# Patient Record
Sex: FEMALE | Race: WHITE
[De-identification: names, ages, dates, MRNs, and addresses within clinical notes are randomized per-mention and may not be internally consistent; named-entity substitution may affect disease eponyms.]

---

## 2018-04-17 ENCOUNTER — HOSPITAL ENCOUNTER (OUTPATIENT)
Dept: HOSPITAL 62 - CCL | Age: 68
Discharge: HOME | End: 2018-04-17
Attending: INTERNAL MEDICINE
Payer: MEDICARE

## 2018-04-17 VITALS — DIASTOLIC BLOOD PRESSURE: 67 MMHG | SYSTOLIC BLOOD PRESSURE: 123 MMHG

## 2018-04-17 DIAGNOSIS — E11.9: ICD-10-CM

## 2018-04-17 DIAGNOSIS — I50.20: Primary | ICD-10-CM

## 2018-04-17 DIAGNOSIS — R07.9: ICD-10-CM

## 2018-04-17 DIAGNOSIS — I11.9: ICD-10-CM

## 2018-04-17 DIAGNOSIS — I25.119: ICD-10-CM

## 2018-04-17 DIAGNOSIS — Z88.0: ICD-10-CM

## 2018-04-17 DIAGNOSIS — E66.9: ICD-10-CM

## 2018-04-17 DIAGNOSIS — E03.9: ICD-10-CM

## 2018-04-17 DIAGNOSIS — E70.1: ICD-10-CM

## 2018-04-17 DIAGNOSIS — Z88.2: ICD-10-CM

## 2018-04-17 DIAGNOSIS — Z79.899: ICD-10-CM

## 2018-04-17 DIAGNOSIS — E53.8: ICD-10-CM

## 2018-04-17 LAB
ANION GAP SERPL CALC-SCNC: 15 MMOL/L (ref 5–19)
BUN SERPL-MCNC: 22 MG/DL (ref 7–20)
CALCIUM: 10.3 MG/DL (ref 8.4–10.2)
CHLORIDE SERPL-SCNC: 105 MMOL/L (ref 98–107)
CO2 SERPL-SCNC: 23 MMOL/L (ref 22–30)
ERYTHROCYTE [DISTWIDTH] IN BLOOD BY AUTOMATED COUNT: 13.2 % (ref 11.5–14)
GLUCOSE SERPL-MCNC: 140 MG/DL (ref 75–110)
HCT VFR BLD CALC: 38 % (ref 36–47)
HGB BLD-MCNC: 12.7 G/DL (ref 12–15.5)
INR PPP: 0.96
MCH RBC QN AUTO: 28.6 PG (ref 27–33.4)
MCHC RBC AUTO-ENTMCNC: 33.3 G/DL (ref 32–36)
MCV RBC AUTO: 86 FL (ref 80–97)
PLATELET # BLD: 253 10^3/UL (ref 150–450)
POTASSIUM SERPL-SCNC: 3.8 MMOL/L (ref 3.6–5)
PROTHROMBIN TIME: 13.3 SEC (ref 11.4–15.4)
RBC # BLD AUTO: 4.43 10^6/UL (ref 3.72–5.28)
SODIUM SERPL-SCNC: 143.4 MMOL/L (ref 137–145)
WBC # BLD AUTO: 5.5 10^3/UL (ref 4–10.5)

## 2018-04-17 PROCEDURE — 85610 PROTHROMBIN TIME: CPT

## 2018-04-17 PROCEDURE — 93458 L HRT ARTERY/VENTRICLE ANGIO: CPT

## 2018-04-17 PROCEDURE — 36415 COLL VENOUS BLD VENIPUNCTURE: CPT

## 2018-04-17 PROCEDURE — 83735 ASSAY OF MAGNESIUM: CPT

## 2018-04-17 PROCEDURE — 4A023N7 MEASUREMENT OF CARDIAC SAMPLING AND PRESSURE, LEFT HEART, PERCUTANEOUS APPROACH: ICD-10-PCS

## 2018-04-17 PROCEDURE — 85027 COMPLETE CBC AUTOMATED: CPT

## 2018-04-17 PROCEDURE — 80048 BASIC METABOLIC PNL TOTAL CA: CPT

## 2018-04-17 NOTE — OPERATIVE REPORT
Operative Report


DATE OF SURGERY: 04/17/18


PREOPERATIVE DIAGNOSIS: Left Heart Catheterization with coronary angiography 

and left ventriculography


POSTOPERATIVE DIAGNOSIS: Normal nonobstructive coronary disease with mild 

global LV systolic dysfunction.


OPERATION: Left heart catheterization with coronary angiography and left 

ventriculography.


SURGEON: THERESE DONAHUE


ANESTHESIA: Moderate Sedation


COMPLICATIONS: 





no apparent complications


ESTIMATED BLOOD LOSS: minimal


INTRAOPERATIVE FINDINGS: Minimal nonobstructive coronary artery disease with 

mild global LV systolic dysfunction.


PROCEDURE: 


Procedure: 68-year-old female with complaints of recurrent angina pectoris with 

exertion.  A stress test was performed which showed normal Cardiolite perfusion 

with mild LV systolic dysfunction.  Despite no evidence of ischemia nor infarct

, the patient continued to have symptoms on medical therapy.  For continued 

symptoms and concern of multivessel disease given her comorbidities, she is 

referred for diagnostic coronary angiography.  





Procedures performed:


1.  Left heart catheterization.


2.  Selective coronary arteriography.


3.  Left ventriculography.





: Therese Donahue DO





Contrast utilized: 110 mL of Optiray 320





Procedure brief: After informed consent was obtained, the patient was brought 

to the catheterization lab in stable condition.  Bilateral groins and the right 

wrist were prepped and draped in sterile fashion.  The patient was given IV 

moderate conscious sedation by the cardiac catheterization lab nurse with 

Versed and fentanyl which was supervised by the interventional cardiologist.  

The following parameters were monitored: Oxygen saturation, heart rate, blood 

pressure, and response to care.  Total physician intra-service time was 32 

minutes.  





After local infiltration of the right wrist with 2% lidocaine, the right radial 

artery was punctured with a micropuncture needle and a 6 Moroccan Terumo 

hydrophilic sheath was inserted into the right radial artery using modified 

cylinder technique and a micropuncture kit.  The sheath was then flushed with a 

spasmolytic cocktail of nitroglycerin, verapamil, and lidocaine.  The patient 

was given 5000 units of IV heparin.





Next, left heart catheterization was performed using a 6 Moroccan Tiger 

diagnostic catheter across the aortic valve in a retrograde fashion.  Left 

ventricular pressure was measured and the catheter was positioned retrograde 

through the aortic valve and pressure measured in the aortic root.  Next, 

selective coronary arteriography was performed using a 6 Moroccan Tiger catheter 

is seen in the ostium the right coronary artery.  Multiple injections were 

performed in orthogonal views and the catheter was then positioned into the 

ostium the left main coronary artery.  Multiple injections were performed in 

orthogonal views of the catheter was removed from the body without difficulty.  

It was exchanged for a 6 Moroccan pigtail catheter which was used to position 

retrograde across the aortic valve and left ventricular cavity.  Left 

ventriculogram was performed in the FERNÁNDEZ projection.  The catheter was removed 

from the body without difficulty.





Findings:





Left heart hemodynamics


/10, LVEDP 14


Ao 125/57 (87)





Coronary arteriography





Left main coronary artery: Left main coronary is angiographically normal giving 

rise to the LAD and left circumflex arteries.





Left anterior descending artery: The left anterior descending artery courses 

over the anterior interventricular septum and terminates across the cardiac 

apex.  The LAD provides 2 major diagonal branch arteries.  The LAD has minimal 

irregularities throughout its course.  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 

to distal portion of the artery.  There is no significant obstructive disease 

throughout the LAD system.  The first major diagonal branch artery is large 

caliber and has minimal irregularity.  The second diagonal branch artery is of 

smaller caliber and angiographically normal.





Left circumflex artery: The left circumflex artery is a large-caliber artery 

and gives rise to one large obtuse marginal branch artery.  After the 

bifurcation of the obtuse marginal branch artery, the left AV groove is quite 

small caliber and terminates into small left posterolateral branches.  The left 

circumflex artery and the obtuse marginal branch artery harbor minimal luminal 

irregularities.





Right coronary artery: The right coronary artery is the dominant vessel gives 

rise to the right posterior descending artery.  The right coronary artery has 

no significant luminal irregularity throughout its course.  It bifurcates 

normally into the right posterior descending artery and the right 

posterolateral artery.  These terminal branches have no significant disease but 

are of very small caliber.





Left ventriculography: In the FERNÁNDEZ projection, the LV is mildly dilated.  There 

is global LV systolic dysfunction with ejection fraction 45-50%.  There is no 

significant mitral regurgitation.





Right radial artery sheath was removed and hemostasis achieved with the TR 

band.  The patient tolerated the procedure well and there were no apparent 

complications at the end of the case.  There is very minimal blood loss.





Impression:


1.  Very minimal coronary artery disease, out of proportion to the level of 

cardiomyopathy.


2.  No evidence of aortic stenosis.


3.  Mild global LV systolic dysfunction, EF 45-50%.


4.  Trace mitral regurgitation.





Plan:


1.  Continue optimal medical therapy.


2.  Follow-up with Dr. Levin as an outpatient.





I appreciate the opportunity to help participate in this patient's 

cardiovascular care.  If you should have any questions for me regarding this 

patient's cardiac catheterization, please do not hesitate to contact me at the 

[facility].

## 2018-04-17 NOTE — DISCHARGE SUMMARY
Discharge Summary (SDC)





- Discharge


Final Diagnosis: 


Minimal nonobstructive coronary disease


Mild global LV systolic dysfunction





Date of Surgery: 04/17/18


Discharge Date: 04/17/18


Condition: Good


Treatment or Instructions: 


1.  No lifting greater than 10 pounds for the next week.


2.  Shower only for the next 7 days.


3.  No operating heavy machinery for the next 24 hours.


4.  Please refer to discharge medications for complete list of medications and 

other medication instructions.  Do NOT restart metformin until Friday morning, 

April 20, 2018.


5.  If you notice any severe pain, redness, or swelling in the right wrist, 

call the [facility] immediately.


6.  Follow-up with Dr. Levin on April 25 as planned


Discharge Diet: Cardiac, Diabetic


Discharge Activity: No Lifting Over 10 Pounds


Home Care Assistance: None Needed


Report the Following to Your Physician Immediately: Increase in Pain, Fever 

over 101 Degrees, Unusual Bleeding, Redness, Swelling, Tingling Sensation

## 2018-09-07 ENCOUNTER — HOSPITAL ENCOUNTER (EMERGENCY)
Dept: HOSPITAL 62 - ER | Age: 68
LOS: 1 days | Discharge: HOME | End: 2018-09-08
Payer: MEDICARE

## 2018-09-07 DIAGNOSIS — N39.0: Primary | ICD-10-CM

## 2018-09-07 DIAGNOSIS — R07.9: ICD-10-CM

## 2018-09-07 DIAGNOSIS — E11.9: ICD-10-CM

## 2018-09-07 DIAGNOSIS — E03.9: ICD-10-CM

## 2018-09-07 DIAGNOSIS — E78.5: ICD-10-CM

## 2018-09-07 DIAGNOSIS — I10: ICD-10-CM

## 2018-09-07 LAB
ADD MANUAL DIFF: NO
ALBUMIN SERPL-MCNC: 4.5 G/DL (ref 3.5–5)
ALP SERPL-CCNC: 62 U/L (ref 38–126)
ALT SERPL-CCNC: 15 U/L (ref 9–52)
ANION GAP SERPL CALC-SCNC: 12 MMOL/L (ref 5–19)
APPEARANCE UR: (no result)
APTT PPP: YELLOW S
AST SERPL-CCNC: 26 U/L (ref 14–36)
BASOPHILS # BLD AUTO: 0.1 10^3/UL (ref 0–0.2)
BASOPHILS NFR BLD AUTO: 0.8 % (ref 0–2)
BILIRUB DIRECT SERPL-MCNC: 0.3 MG/DL (ref 0–0.4)
BILIRUB SERPL-MCNC: 0.4 MG/DL (ref 0.2–1.3)
BILIRUB UR QL STRIP: NEGATIVE
BUN SERPL-MCNC: 21 MG/DL (ref 7–20)
CALCIUM: 9.8 MG/DL (ref 8.4–10.2)
CHLORIDE SERPL-SCNC: 107 MMOL/L (ref 98–107)
CO2 SERPL-SCNC: 23 MMOL/L (ref 22–30)
EOSINOPHIL # BLD AUTO: 0.2 10^3/UL (ref 0–0.6)
EOSINOPHIL NFR BLD AUTO: 2.8 % (ref 0–6)
ERYTHROCYTE [DISTWIDTH] IN BLOOD BY AUTOMATED COUNT: 14 % (ref 11.5–14)
GLUCOSE SERPL-MCNC: 99 MG/DL (ref 75–110)
GLUCOSE UR STRIP-MCNC: NEGATIVE MG/DL
HCT VFR BLD CALC: 37.4 % (ref 36–47)
HGB BLD-MCNC: 12.2 G/DL (ref 12–15.5)
KETONES UR STRIP-MCNC: NEGATIVE MG/DL
LYMPHOCYTES # BLD AUTO: 1.7 10^3/UL (ref 0.5–4.7)
LYMPHOCYTES NFR BLD AUTO: 27.4 % (ref 13–45)
MCH RBC QN AUTO: 27.8 PG (ref 27–33.4)
MCHC RBC AUTO-ENTMCNC: 32.6 G/DL (ref 32–36)
MCV RBC AUTO: 85 FL (ref 80–97)
MONOCYTES # BLD AUTO: 0.5 10^3/UL (ref 0.1–1.4)
MONOCYTES NFR BLD AUTO: 7.7 % (ref 3–13)
NEUTROPHILS # BLD AUTO: 3.7 10^3/UL (ref 1.7–8.2)
NEUTS SEG NFR BLD AUTO: 61.3 % (ref 42–78)
NITRITE UR QL STRIP: NEGATIVE
NT PRO BNP: 101 PG/ML (ref 5–900)
PH UR STRIP: 5 [PH] (ref 5–9)
PLATELET # BLD: 300 10^3/UL (ref 150–450)
POTASSIUM SERPL-SCNC: 4.1 MMOL/L (ref 3.6–5)
PROT SERPL-MCNC: 8.2 G/DL (ref 6.3–8.2)
PROT UR STRIP-MCNC: 30 MG/DL
RBC # BLD AUTO: 4.38 10^6/UL (ref 3.72–5.28)
SODIUM SERPL-SCNC: 142.3 MMOL/L (ref 137–145)
SP GR UR STRIP: 1.02
TOTAL CELLS COUNTED % (AUTO): 100 %
TROPONIN I SERPL-MCNC: < 0.012 NG/ML
UROBILINOGEN UR-MCNC: NEGATIVE MG/DL (ref ?–2)
WBC # BLD AUTO: 6 10^3/UL (ref 4–10.5)

## 2018-09-07 PROCEDURE — 83880 ASSAY OF NATRIURETIC PEPTIDE: CPT

## 2018-09-07 PROCEDURE — 87088 URINE BACTERIA CULTURE: CPT

## 2018-09-07 PROCEDURE — 99285 EMERGENCY DEPT VISIT HI MDM: CPT

## 2018-09-07 PROCEDURE — 80053 COMPREHEN METABOLIC PANEL: CPT

## 2018-09-07 PROCEDURE — 81001 URINALYSIS AUTO W/SCOPE: CPT

## 2018-09-07 PROCEDURE — 87186 SC STD MICRODIL/AGAR DIL: CPT

## 2018-09-07 PROCEDURE — 84484 ASSAY OF TROPONIN QUANT: CPT

## 2018-09-07 PROCEDURE — 87086 URINE CULTURE/COLONY COUNT: CPT

## 2018-09-07 PROCEDURE — 36415 COLL VENOUS BLD VENIPUNCTURE: CPT

## 2018-09-07 PROCEDURE — 71046 X-RAY EXAM CHEST 2 VIEWS: CPT

## 2018-09-07 PROCEDURE — 93005 ELECTROCARDIOGRAM TRACING: CPT

## 2018-09-07 PROCEDURE — 93010 ELECTROCARDIOGRAM REPORT: CPT

## 2018-09-07 PROCEDURE — 85025 COMPLETE CBC W/AUTO DIFF WBC: CPT

## 2018-09-07 NOTE — RADIOLOGY REPORT (SQ)
EXAM DESCRIPTION:  CHEST 2 VIEWS



COMPLETED DATE/TIME:  9/7/2018 6:20 pm



REASON FOR STUDY:  chest pain



COMPARISON:  December 2012



EXAM PARAMETERS:  NUMBER OF VIEWS: two views

TECHNIQUE: Digital Frontal and Lateral radiographic views of the chest acquired.

RADIATION DOSE: NA

LIMITATIONS: none



FINDINGS:  LUNGS AND PLEURA: No opacities, masses or pneumothorax. No pleural effusion.

MEDIASTINUM AND HILAR STRUCTURES: No masses or contour abnormalities.

HEART AND VASCULAR STRUCTURES: Heart normal size.  No evidence for failure.

BONES: No acute findings.

HARDWARE: None in the chest.

OTHER: No other significant finding.



IMPRESSION:  NO ACUTE RADIOGRAPHIC FINDING IN THE CHEST.



TECHNICAL DOCUMENTATION:  JOB ID:  [id number]

 2011 [email or web address]- All Rights Reserved



Reading location - IP/workstation name: [email or web address]

## 2018-09-07 NOTE — EKG REPORT
SEVERITY:- ABNORMAL ECG -

SINUS RHYTHM

PAIRED VENTRICULAR PREMATURE COMPLEXES

NONSPECIFIC ST-T CHANGES  LATERAL LEADS

:

Confirmed by: Chau Levine MD 07-Sep-2018 19:13:49

## 2018-09-07 NOTE — ER DOCUMENT REPORT
ED Medical Screen (RME)





- General


Chief Complaint: Chest Pain


Stated Complaint: CHEST PAIN


Time Seen by Provider: 09/07/18 17:19


Mode of Arrival: Ambulatory


Information source: Patient, [facility] Records


Notes: 





68-year-old female with type 2 diabetes, hyperlipidemia, hypertension, 

hypothyroidism presents with complaint of left-sided chest pain that started 

last night while in the shower.  Patient describes the pain as intermittent, 

burning and worse with movement.  Patient denies any associated nausea, vomiting

, shortness of breath, recent illness.  Patient states that she was seen by Dr. Levin and a stress test was performed at [facility] but she is 

on sure of the results.  She does state that she was told that she has a "weak 

heart".  Patient does admit to increased stress with the sister who is in the 

hospital recently underwent open heart surgery.  Patient did have a cardiac 

cath here in April 2018.








I have greeted and performed a rapid initial assessment of this patient.  A 

comprehensive ED assessment and evaluation of the patient, analysis of test 

results and completion of medical decision making process we will be contacted 

by additional ED providers.











PHYSICAL EXAMINATION:





GENERAL: Well-appearing, well-nourished and in no acute distress.





HEAD: Atraumatic, normocephalic.





EYES: Pupils equal round extraocular movements intact,  conjunctiva are normal.





LUNGS: CTAB


Cardio.  Regular rate rhythm without murmurs.


Musculoskeletal: Normal range of motion





NEUROLOGICAL:  Normal speech, normal gait. 





PSYCH: Normal mood, normal affect.





SKIN: Warm, Dry, normal turgor, no rashes or lesions noted.


TRAVEL OUTSIDE OF THE [place] IN LAST 30 DAYS: No





- HPI


Onset: Yesterday


Onset/Duration: Sudden, Intermittent


Quality of pain: Burning, Other - tightness


Severity: Mild


Associated Symptoms: Chest pain.  denies: Cough (productive), Shortness of 

breath


Exacerbated by: Movement


Relieved by: Remaining still


Similar symptoms previously: No


Recently seen / treated by doctor: Yes





- Related Data


Smoking: Non-smoker


Frequency of alcohol use: None


Drug Abuse: None


Allergies/Adverse Reactions: 


 





silicone Allergy (Intermediate, Verified 04/17/18 07:01)


 INFECTION IN EAR


Penicillins Allergy (Mild, Verified 04/17/18 07:01)


 RASH,ITCHING


Sulfa (Sulfonamide Antibiotics) Allergy (Unknown, Verified 04/17/18 07:01)


 











Past Medical History





- Social History


Chew tobacco use (# tins/day): No


Frequency of alcohol use: None


Drug Abuse: None





- Past Medical History


Cardiac Medical History: Reports: Hx Hypercholesterolemia, Hx Hypertension


   Denies: Hx Coronary Artery Disease, Hx Heart Attack


Pulmonary Medical History: 


   Denies: Hx Asthma, Hx Bronchitis, Hx COPD, Hx Pneumonia


Neurological Medical History: Denies: Hx Cerebrovascular Accident, Hx Seizures


Endocrine Medical History: Reports: Hx Diabetes Mellitus Type 2


Renal/ Medical History: Denies: Hx Peritoneal Dialysis


Musculoskeltal Medical History: Denies Hx Arthritis


Past Surgical History: Reports: Hx Orthopedic Surgery - right elbow, Hx Tubal 

Ligation





- Immunizations


Hx Diphtheria, Pertussis, Tetanus Vaccination: No


History of Influenza Vaccine for 10/2017 - 3/2018 Season: Yes





Physical Exam





- Vital signs


Vitals: 


 











Temp Pulse Resp BP Pulse Ox


 


 98.4 F   97   18   143/68 H  94 


 


 09/07/18 16:21  09/07/18 16:21  09/07/18 16:21  09/07/18 16:21  09/07/18 16:21














Course





- Vital Signs


Vital signs: 


 











Temp Pulse Resp BP Pulse Ox


 


 98.4 F   97   18   143/68 H  94 


 


 09/07/18 16:21  09/07/18 16:21  09/07/18 16:21  09/07/18 16:21  09/07/18 16:21

## 2018-09-08 VITALS — SYSTOLIC BLOOD PRESSURE: 143 MMHG | DIASTOLIC BLOOD PRESSURE: 83 MMHG

## 2018-09-08 NOTE — ER DOCUMENT REPORT
ED General





- General


Chief Complaint: Chest Pain


Stated Complaint: CHEST PAIN


Time Seen by Provider: 09/07/18 17:19


Mode of Arrival: Ambulatory


Notes: 





Patient is a 68-year-old female with a past medical history of hypertension, 

hyperlipidemia, hypothyroidism who presents with continuous left-sided chest 

discomfort last evening.  Approximately total of 36 hours in duration.  She 

notes that the chest pain has now resolved since being here in the emergency 

department and is currently chest pain-free.  She states that the pain started 

rapidly and has been ongoing since that time.  Worsened by movement.  Nothing 

improves the pain.  Denies history of similar symptoms in the past.  She 

describes it as a burning, stabbing pain.  She has not contacted her general 

doctor regarding today's concerns.  Had a normal cardiac catheterization in 

April of this year.  Denies any associated nausea, vomiting or diaphoresis.  No 

history of DVT or pulmonary embolus.


TRAVEL OUTSIDE OF THE [place] IN LAST 30 DAYS: No





- Related Data


Allergies/Adverse Reactions: 


 





silicone Allergy (Intermediate, Verified 04/17/18 07:01)


 INFECTION IN EAR


Penicillins Allergy (Mild, Verified 04/17/18 07:01)


 RASH,ITCHING


Sulfa (Sulfonamide Antibiotics) Allergy (Unknown, Verified 04/17/18 07:01)


 











Past Medical History





- General


Information source: [facility], [facility]





- Social History


Smoking Status: Never Smoker


Chew tobacco use (# tins/day): No


Frequency of alcohol use: None


Drug Abuse: None


Lives with: Spouse/Significant other


Family History: Reviewed & Not Pertinent


Patient has suicidal ideation: No


Patient has homicidal ideation: No





- Past Medical History


Cardiac Medical History: Reports: Hx Hypercholesterolemia, Hx Hypertension


   Denies: Hx Coronary Artery Disease, Hx Heart Attack


Pulmonary Medical History: 


   Denies: Hx Asthma, Hx Bronchitis, Hx COPD, Hx Pneumonia


Neurological Medical History: Denies: Hx Cerebrovascular Accident, Hx Seizures


Endocrine Medical History: Reports: Hx Diabetes Mellitus Type 2


Renal/ Medical History: Denies: Hx Peritoneal Dialysis


Musculoskeletal Medical History: Denies Hx Arthritis


Past Surgical History: Reports: Hx Orthopedic Surgery - right elbow, Hx Tubal 

Ligation





- Immunizations


Hx Diphtheria, Pertussis, Tetanus Vaccination: No


Hx Pneumococcal Vaccination: 02/01/18





Review of Systems





- Review of Systems


Notes: 





Constitutional: Negative for fever.


HENT: Negative for sore throat.


Eyes: Negative for visual changes.


Cardiovascular: Positive for chest pain.


Respiratory: Negative for shortness of breath.


Gastrointestinal: Negative for abdominal pain, vomiting or diarrhea.


Genitourinary: Negative for dysuria.


Musculoskeletal: Negative for back pain.


Skin: Negative for rash.


Neurological: Negative for headaches, weakness or numbness.





10 point ROS negative except as marked above and in HPI.





Physical Exam





- Vital signs


Vitals: 


 











Temp Pulse Resp BP Pulse Ox


 


 98.4 F   97   18   143/68 H  94 


 


 09/07/18 16:21  09/07/18 16:21  09/07/18 16:21  09/07/18 16:21  09/07/18 16:21











Interpretation: Hypertensive


Notes: 





PHYSICAL EXAMINATION:





GENERAL: Well-appearing, well-nourished and in no acute distress.





HEAD: Atraumatic, normocephalic.





EYES: Pupils equal round and reactive to light, extraocular movements intact, 

sclera anicteric, conjunctiva are normal.





ENT: nares patent, oropharynx clear without exudates.  Moist mucous membranes.





NECK: Normal range of motion, supple without lymphadenopathy





LUNGS: Breath sounds clear to auscultation bilaterally and equal.  No wheezes 

rales or rhonchi.





HEART: Regular rate and rhythm without murmurs





ABDOMEN: Soft, nontender, normoactive bowel sounds.  No guarding, no rebound.  

No masses appreciated.





EXTREMITIES: Normal range of motion, no pitting or edema.  No cyanosis.





NEUROLOGICAL: No focal neurological deficits. Moves all extremities 

spontaneously and on command.





PSYCH: Normal mood, normal affect.





SKIN: Warm, Dry, normal turgor, no rashes or lesions noted.





Course





- Re-evaluation


Re-evalutation: 





09/08/18 00:41


Presentation of chest pain in an otherwise well appearing patient. Low clinical 

suspicion for ACS given clinical history, exam, EKG without ST elevations or 

depressions, and negative initial troponin. HEART score less than or equal to 

3. PE also seems unlikely given clinical history, absence of tachycardia or 

dyspnea.  Wells score is 0. CXR without evidence of pneumothorax or pneumonia. 

No widened mediastinum. Aortic dissection also seems unlikely given history, 

symmetric pulses, CXR, and vitals.  Delta troponin remained normal.  Patient 

has also been chest pain-free for the entire time here in the emergency 

department.  Exact detail chest pain is unclear but does not appear to be from 

any immediate life-threatening cause.  Her cardiac catheterization from April 

of this year did not show any obstructive coronary artery disease.  The patient 

does also have findings consistent with pyelonephritis on her urinalysis 

although denies symptoms and there are no exam findings to suggest this 

diagnosis.  However given the marked degree of infection on urinalysis she will 

be empirically treated with a course of antibiotics and a culture has been 

sent.  At this time will discharge with return precautions and follow-up 

recommendations.  Verbal discharge instructions given a the bedside and 

opportunity for questions given. Medication warnings reviewed. Patient is in 

agreement with this plan and has verbalized understanding of return precautions 

and the need for primary care follow-up in the next 24-72 hours.








- Vital Signs


Vital signs: 


 











Temp Pulse Resp BP Pulse Ox


 


 98.7 F   89   18   143/83 H  97 


 


 09/08/18 00:50  09/08/18 00:50  09/08/18 00:50  09/08/18 00:50  09/08/18 00:50














- Laboratory


Result Diagrams: 


 09/07/18 17:54





 09/07/18 17:54


Laboratory results interpreted by me: 


 











  09/07/18 09/07/18





  17:54 17:54


 


BUN  21 H 


 


Urine Protein   30 H


 


Ur Leukocyte Esterase   LARGE H














- Diagnostic Test


Radiology reviewed: Image reviewed, Reports reviewed


Radiology results interpreted by me: 





09/08/18 00:42


Chest x-ray: No acute infiltrate or pneumothorax





- EKG Interpretation by Me


Additional EKG results interpreted by me: 





09/08/18 00:42


Sinus rhythm.  Rate 91.  Intermittent PVCs.  No ST elevations or depressions.  

QTC is 443.





Discharge





- Discharge


Clinical Impression: 


Urinary tract infection


Qualifiers:


 Urinary tract infection type: site unspecified Hematuria presence: without 

hematuria Qualified Code(s): N39.0 - Urinary tract infection, site not specified





Chest pain


Qualifiers:


 Chest pain type: unspecified Qualified Code(s): R07.9 - Chest pain, unspecified





Condition: Good


Disposition: HOME, SELF-CARE


Additional Instructions: 


Your urine shows findings consistent with a urinary tract infection.  Please 

take all the antibiotics as directed even if your symptoms have improved.  

Please follow-up with your primary care physician as needed.  Return to 

emergency room if you develop fever >101F, persistent vomiting, become lethargic

, have severe pain in your sides, or any other symptoms that are concerning to 

you.








You were seen today for chest pain.  The exact cause of your pain is unclear. 

However, based on your cardiac enzyme testing, chest x-ray, and EKG it does not 

appear that it is from an immediately life-threatening cause at this time.  

Although your testing here is normal is critical that you follow-up with your 

primary care physician for continued evaluation of this chest pain and possible 

stress testing.  I recommended you see your physician within the next 24-48 

hours to be evaluated for consideration of a stress test.  Please return to 

emergency department immediately if you have worsening of your chest pain, 

shortness of breath, vomiting, become unable to exert yourself due to pain or 

difficulty breathing, you pass out, or have any pain that radiates into your 

arms, jaw, or back. Please also return if you have any additional symptoms that 

are concerning to you.


Prescriptions: 


Cephalexin Monohydrate [Keflex 500 mg Capsule] 500 mg PO Q6H 5 Days  capsule

## 2019-04-15 ENCOUNTER — HOSPITAL ENCOUNTER (OUTPATIENT)
Dept: HOSPITAL 62 - END | Age: 69
Discharge: HOME | End: 2019-04-15
Attending: INTERNAL MEDICINE
Payer: MEDICARE

## 2019-04-15 VITALS — SYSTOLIC BLOOD PRESSURE: 125 MMHG | DIASTOLIC BLOOD PRESSURE: 95 MMHG

## 2019-04-15 DIAGNOSIS — D12.0: ICD-10-CM

## 2019-04-15 DIAGNOSIS — E66.9: ICD-10-CM

## 2019-04-15 DIAGNOSIS — I42.8: ICD-10-CM

## 2019-04-15 DIAGNOSIS — G47.30: ICD-10-CM

## 2019-04-15 DIAGNOSIS — Z79.899: ICD-10-CM

## 2019-04-15 DIAGNOSIS — Z88.2: ICD-10-CM

## 2019-04-15 DIAGNOSIS — E53.8: ICD-10-CM

## 2019-04-15 DIAGNOSIS — E03.9: ICD-10-CM

## 2019-04-15 DIAGNOSIS — E78.01: ICD-10-CM

## 2019-04-15 DIAGNOSIS — Z88.0: ICD-10-CM

## 2019-04-15 DIAGNOSIS — K57.30: ICD-10-CM

## 2019-04-15 DIAGNOSIS — Z12.11: Primary | ICD-10-CM

## 2019-04-15 DIAGNOSIS — I11.9: ICD-10-CM

## 2019-04-15 DIAGNOSIS — Z79.84: ICD-10-CM

## 2019-04-15 PROCEDURE — 45380 COLONOSCOPY AND BIOPSY: CPT

## 2019-04-15 PROCEDURE — 88305 TISSUE EXAM BY PATHOLOGIST: CPT

## 2019-04-15 PROCEDURE — 82962 GLUCOSE BLOOD TEST: CPT

## 2019-04-15 NOTE — OPERATIVE REPORT
Operative Report


DATE OF SURGERY: 04/15/19


Operative Report: 





The risks, benefits and alternatives of the procedure including the risk of 

bleeding, perforation requiring surgery have been explained to the patient in 

detail and informed consent has been obtained.  Patient was taken back to the 

endoscopy suite and placed in the left, lateral decubital position.  Timeout was

called.  Propofol medication is administered.  Rectal examination was done which

did not reveal any masses, tears or fissures.  An Olympus videoscope was 

introduced into the patient's rectum.  Scope was then carefully advanced all the

way to the cecum.  Cecum was identified by the usual anatomical landmarks 

including the ileocecal valve as well as the appendiceal office.  

Photodocumentation is obtained.  The scope was then sequentially pulled back via

the various segments of the colon including the ascending colon, hepatic 

flexure, transverse colon, splenic flexure, descending colon and finally into 

the rectosigmoid portions of the colon.  Retroflexion maneuver was performed.


PREOPERATIVE DIAGNOSIS: Personal history of polyp


POSTOPERATIVE DIAGNOSIS: Small sessile cecal polyp removed via biopsy forceps.  

Diverticulosis without any evidence of diverticulitis


OPERATION: Colonoscopy with biopsy


SURGEON: ISIDRO VALERO


ANESTHESIA: LMAC


TISSUE REMOVED OR ALTERED: As noted above.


COMPLICATIONS: 





None.


ESTIMATED BLOOD LOSS: None.


INTRAOPERATIVE FINDINGS: As noted above.


PROCEDURE: 





Patient tolerated the procedure well.


No immediate postprocedure complications are noted.


Patient discharged in good condition.


Discharge date 4/15/2019.


Discharge diet: Regular.


Discharge activity: Regular.


2-3-week follow-up to discuss findings.


Patient is instructed to call the office or proceed to the emergency room should

there be any further problems or questions.


Wait on the pathology.


Likely 5-year surveillance colonoscopy.

## 2019-09-05 ENCOUNTER — HOSPITAL ENCOUNTER (EMERGENCY)
Dept: HOSPITAL 62 - ER | Age: 69
Discharge: HOME | End: 2019-09-05
Payer: MEDICARE

## 2019-09-05 VITALS — DIASTOLIC BLOOD PRESSURE: 73 MMHG | SYSTOLIC BLOOD PRESSURE: 137 MMHG

## 2019-09-05 DIAGNOSIS — L03.221: Primary | ICD-10-CM

## 2019-09-05 DIAGNOSIS — I10: ICD-10-CM

## 2019-09-05 DIAGNOSIS — E11.9: ICD-10-CM

## 2019-09-05 PROCEDURE — 99282 EMERGENCY DEPT VISIT SF MDM: CPT
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
## 2019-09-07 ENCOUNTER — HOSPITAL ENCOUNTER (EMERGENCY)
Dept: HOSPITAL 62 - ER | Age: 69
Discharge: HOME | End: 2019-09-07
Payer: MEDICARE

## 2019-09-07 VITALS — DIASTOLIC BLOOD PRESSURE: 81 MMHG | SYSTOLIC BLOOD PRESSURE: 146 MMHG

## 2019-09-07 DIAGNOSIS — E11.9: ICD-10-CM

## 2019-09-07 DIAGNOSIS — I10: ICD-10-CM

## 2019-09-07 DIAGNOSIS — L03.221: Primary | ICD-10-CM

## 2019-09-07 PROCEDURE — 99282 EMERGENCY DEPT VISIT SF MDM: CPT
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
## 2020-02-12 ENCOUNTER — HOSPITAL ENCOUNTER (EMERGENCY)
Dept: HOSPITAL 62 - ER | Age: 70
LOS: 1 days | Discharge: LEFT BEFORE DISCHARGE | End: 2020-02-13
Payer: MEDICARE

## 2020-02-12 VITALS — DIASTOLIC BLOOD PRESSURE: 83 MMHG | SYSTOLIC BLOOD PRESSURE: 141 MMHG

## 2020-02-12 DIAGNOSIS — E11.9: ICD-10-CM

## 2020-02-12 DIAGNOSIS — I10: ICD-10-CM

## 2020-02-12 DIAGNOSIS — Z88.2: ICD-10-CM

## 2020-02-12 DIAGNOSIS — Z88.0: ICD-10-CM

## 2020-02-12 DIAGNOSIS — E03.9: ICD-10-CM

## 2020-02-12 DIAGNOSIS — E78.00: ICD-10-CM

## 2020-02-12 DIAGNOSIS — R10.9: Primary | ICD-10-CM

## 2020-02-12 DIAGNOSIS — M54.9: ICD-10-CM

## 2020-02-12 LAB
ADD MANUAL DIFF: NO
ALBUMIN SERPL-MCNC: 4 G/DL (ref 3.5–5)
ALP SERPL-CCNC: 105 U/L (ref 38–126)
ANION GAP SERPL CALC-SCNC: 19 MMOL/L (ref 5–19)
APPEARANCE UR: (no result)
APTT PPP: YELLOW S
AST SERPL-CCNC: 39 U/L (ref 14–36)
BASOPHILS # BLD AUTO: 0 10^3/UL (ref 0–0.2)
BASOPHILS NFR BLD AUTO: 0.5 % (ref 0–2)
BILIRUB DIRECT SERPL-MCNC: 0.4 MG/DL (ref 0–0.4)
BILIRUB SERPL-MCNC: 0.5 MG/DL (ref 0.2–1.3)
BILIRUB UR QL STRIP: NEGATIVE
BUN SERPL-MCNC: 22 MG/DL (ref 7–20)
CALCIUM: 9.5 MG/DL (ref 8.4–10.2)
CHLORIDE SERPL-SCNC: 101 MMOL/L (ref 98–107)
CO2 SERPL-SCNC: 21 MMOL/L (ref 22–30)
EOSINOPHIL # BLD AUTO: 0.1 10^3/UL (ref 0–0.6)
EOSINOPHIL NFR BLD AUTO: 1 % (ref 0–6)
ERYTHROCYTE [DISTWIDTH] IN BLOOD BY AUTOMATED COUNT: 13.5 % (ref 11.5–14)
GLUCOSE SERPL-MCNC: 113 MG/DL (ref 75–110)
GLUCOSE UR STRIP-MCNC: NEGATIVE MG/DL
HCT VFR BLD CALC: 34.4 % (ref 36–47)
HGB BLD-MCNC: 11.5 G/DL (ref 12–15.5)
KETONES UR STRIP-MCNC: NEGATIVE MG/DL
LYMPHOCYTES # BLD AUTO: 1.2 10^3/UL (ref 0.5–4.7)
LYMPHOCYTES NFR BLD AUTO: 12.1 % (ref 13–45)
MCH RBC QN AUTO: 28.7 PG (ref 27–33.4)
MCHC RBC AUTO-ENTMCNC: 33.3 G/DL (ref 32–36)
MCV RBC AUTO: 86 FL (ref 80–97)
MONOCYTES # BLD AUTO: 1.1 10^3/UL (ref 0.1–1.4)
MONOCYTES NFR BLD AUTO: 11.2 % (ref 3–13)
NEUTROPHILS # BLD AUTO: 7.4 10^3/UL (ref 1.7–8.2)
NEUTS SEG NFR BLD AUTO: 75.2 % (ref 42–78)
NITRITE UR QL STRIP: POSITIVE
PH UR STRIP: 6 [PH] (ref 5–9)
PLATELET # BLD: 298 10^3/UL (ref 150–450)
POTASSIUM SERPL-SCNC: 3.5 MMOL/L (ref 3.6–5)
PROT SERPL-MCNC: 8 G/DL (ref 6.3–8.2)
PROT UR STRIP-MCNC: 30 MG/DL
RBC # BLD AUTO: 4 10^6/UL (ref 3.72–5.28)
SP GR UR STRIP: 1.01
TOTAL CELLS COUNTED % (AUTO): 100 %
UROBILINOGEN UR-MCNC: NEGATIVE MG/DL (ref ?–2)
WBC # BLD AUTO: 9.9 10^3/UL (ref 4–10.5)

## 2020-02-12 PROCEDURE — 81001 URINALYSIS AUTO W/SCOPE: CPT

## 2020-02-12 PROCEDURE — 80053 COMPREHEN METABOLIC PANEL: CPT

## 2020-02-12 PROCEDURE — 85025 COMPLETE CBC W/AUTO DIFF WBC: CPT

## 2020-02-12 PROCEDURE — 36415 COLL VENOUS BLD VENIPUNCTURE: CPT
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
